# Patient Record
Sex: MALE | Race: ASIAN | URBAN - METROPOLITAN AREA
[De-identification: names, ages, dates, MRNs, and addresses within clinical notes are randomized per-mention and may not be internally consistent; named-entity substitution may affect disease eponyms.]

---

## 2018-02-15 ENCOUNTER — OFFICE VISIT (OUTPATIENT)
Dept: ORTHOPEDICS | Facility: CLINIC | Age: 28
End: 2018-02-15
Payer: COMMERCIAL

## 2018-02-15 ENCOUNTER — RADIANT APPOINTMENT (OUTPATIENT)
Dept: GENERAL RADIOLOGY | Facility: CLINIC | Age: 28
End: 2018-02-15
Attending: PEDIATRICS
Payer: COMMERCIAL

## 2018-02-15 VITALS
BODY MASS INDEX: 31.57 KG/M2 | SYSTOLIC BLOOD PRESSURE: 118 MMHG | HEIGHT: 63 IN | WEIGHT: 178.2 LBS | DIASTOLIC BLOOD PRESSURE: 62 MMHG

## 2018-02-15 DIAGNOSIS — S68.119A TRAUMATIC AMPUTATION OF FINGERTIP, INITIAL ENCOUNTER: ICD-10-CM

## 2018-02-15 DIAGNOSIS — S62.635A CLOSED DISPLACED FRACTURE OF DISTAL PHALANX OF LEFT RING FINGER, INITIAL ENCOUNTER: ICD-10-CM

## 2018-02-15 DIAGNOSIS — S62.635A CLOSED DISPLACED FRACTURE OF DISTAL PHALANX OF LEFT RING FINGER, INITIAL ENCOUNTER: Primary | ICD-10-CM

## 2018-02-15 PROCEDURE — 73140 X-RAY EXAM OF FINGER(S): CPT | Mod: LT

## 2018-02-15 PROCEDURE — 99204 OFFICE O/P NEW MOD 45 MIN: CPT | Performed by: PEDIATRICS

## 2018-02-15 RX ORDER — HYDROCODONE BITARTRATE AND ACETAMINOPHEN 5; 325 MG/1; MG/1
1 TABLET ORAL EVERY 6 HOURS PRN
COMMUNITY
End: 2018-03-21

## 2018-02-15 NOTE — MR AVS SNAPSHOT
After Visit Summary   2/15/2018    Jean Pierre Lipscomb    MRN: [id number]           Patient Information     Date Of Birth          1990        Visit Information        Provider Department      2/15/2018 11:20 AM Mirta Torres MD [facility] [place]        Today's Diagnoses     Closed displaced fracture of distal phalanx of left ring finger, initial encounter    -  1    Traumatic amputation of fingertip, initial encounter          Care Instructions    Plan:  - Today's Plan of Care:  Referral to an Orthopedic Surgeon  Re-dress and splint finger  Letter for work/restrictions    Follow Up: with ortho hand surgery              Follow-ups after your visit        Additional Services     ORTHO  REFERRAL       [facility] is referring you to the Orthopedic  Services at [facility].       The  Representative will assist you in the coordination of your Orthopedic and Musculoskeletal Care as prescribed by your physician.    The  Representative will call you within 1 business day to help schedule your appointment, or you may contact the  Representative at:    All areas ~ [phone]     Type of Referral : Surgical / Specialist -- hand surgery      Timeframe requested: 1 - 2 days    Coverage of these services is subject to the terms and limitations of your health insurance plan.  Please call member services at your health plan with any benefit or coverage questions.      If X-rays, CT or MRI's have been performed, please contact the facility where they were done to arrange for , prior to your scheduled appointment.  Please bring this referral request to your appointment and present it to your specialist.                  Who to contact     If you have questions or need follow up information about today's clinic visit or your schedule please contact [facility] [place] directly at  "[phone].  Normal or non-critical lab and imaging results will be communicated to you by MyChart, letter or phone within 4 business days after the clinic has received the results. If you do not hear from us within 7 days, please contact the clinic through [employer] or phone. If you have a critical or abnormal lab result, we will notify you by phone as soon as possible.  Submit refill requests through [employer] or call your pharmacy and they will forward the refill request to us. Please allow 3 business days for your refill to be completed.          Additional Information About Your Visit        [employer] Information     [employer] lets you send messages to your doctor, view your test results, renew your prescriptions, schedule appointments and more. To sign up, go to [email or web address] . Click on \"Log in\" on the left side of the screen, which will take you to the Welcome page. Then click on \"Sign up Now\" on the right side of the page.     You will be asked to enter the access code listed below, as well as some personal information. Please follow the directions to create your username and password.     Your access code is: [email or web address]  Expires: 2018 12:25 PM     Your access code will  in 90 days. If you need help or a new code, please call your [place] clinic or [phone].        Care EveryWhere ID     This is your Care EveryWhere ID. This could be used by other organizations to access your [place] medical records  [id number]        Your Vitals Were     Height BMI (Body Mass Index)                5' 3\" (1.6 m) 31.57 kg/m2           Blood Pressure from Last 3 Encounters:   02/15/18 118/62    Weight from Last 3 Encounters:   02/15/18 178 lb 3.2 oz (80.8 kg)              We Performed the Following     ORTHO  REFERRAL        Primary Care Provider Fax #    Physician No Ref-Primary [phone]       No address on file        Equal Access to Services     MAIKEL ALEJANDRO AH: Taylor fermin " Terri, wakrishangigi gregorygabe, jaimie kashannan farris, crow williamson carlosfarida guzmanrubén laLudygamal stacy. So [facility] [phone].    ATENCIÓN: Si trinity davis, tiene a gardner disposición servicios gratuitos de asistencia lingüística. Zenia al [phone].    We comply with applicable federal civil rights laws and [place] laws. We do not discriminate on the basis of race, color, national origin, age, disability, sex, sexual orientation, or gender identity.            Thank you!     Thank you for choosing [facility]  for your care. Our goal is always to provide you with excellent care. Hearing back from our patients is one way we can continue to improve our services. Please take a few minutes to complete the written survey that you may receive in the mail after your visit with us. Thank you!             Your Updated Medication List - Protect others around you: Learn how to safely use, store and throw away your medicines at www.disposemymeds.org.          This list is accurate as of 2/15/18 12:25 PM.  Always use your most recent med list.                   Brand Name Dispense Instructions for use Diagnosis    CEPHALEXIN PO      Take 500 mg by mouth 3 times daily        HYDROcodone-acetaminophen 5-325 MG per tablet    NORCO     Take 1 tablet by mouth every 6 hours as needed for moderate to severe pain

## 2018-02-15 NOTE — PROGRESS NOTES
"Sports Medicine Clinic Visit    PCP: No Ref-Primary, Physician    Jean Pierre Ruel is a 27 year old male who is seen  as an ER referral presenting with left ring finger pain and fracture follow-up    Injury: Patient reports an injury ~ 5 days ago.  His right ring finger was shut in a door.  He went to the ED where he was diagnosed with a fracture and had a repair done.    Location of Pain: right right finger, distal  Duration of Pain: 2/11/18  Rating of Pain at worst: 8/10  Rating of Pain Currently: 8/10  Symptoms are better with: splint  Symptoms are worse with: motion, touch  Additional Features:   Positive: swelling, bruising, paresthesias and numbness   Negative: popping, grinding, catching, locking, instability, weakness and pain in other joints  Other evaluation and/or treatments so far consists of: splint  Prior History of related problems: none    Social History: metal fabrication    Review of Systems  Skin: yes bruising, yes swelling  Musculoskeletal: as above  Neurologic: no numbness, paresthesias  Remainder of review of systems is negative including constitutional, CV, pulmonary, GI, except as noted in HPI or medical history.    Patient's current problem list, past medical and surgical history, and family history were reviewed.    There is no problem list on file for this patient.    No past medical history on file.  No past surgical history on file.  No family history on file.      Objective  /62 (BP Location: Left arm, Patient Position: Sitting, Cuff Size: Adult Large)  Ht 5' 3\" (1.6 m)  Wt 178 lb 3.2 oz (80.8 kg)  BMI 31.57 kg/m2    GENERAL APPEARANCE: healthy, alert and no distress   GAIT: NORMAL  SKIN: no suspicious lesions or rashes  HEENT: Sclera clear, anicteric  CV: good peripheral pulses  RESP: Breathing not labored  NEURO: Normal strength and tone, mentation intact and speech normal  PSYCH:  mentation appears normal and affect normal/bright    Bilateral Wrist and Hand exam  Inspection:       " Left ring finger partial amputation with intact sutures bilaterally, some dried blood    Tender:       Distal phalanx left    Non Tender:       Remainder of the Wrist and Hand left    ROM:       Limited motion of left DIP joint 4th finger    Strength:       Limited strength left DIP joint 4th finger    Neurovascular:       2+ radial pulses bilaterally with brisk capillary refill and      normal sensation to light touch in the radial, median and ulnar nerve distributions      Radiology  I visualized and reviewed these images with the patient  3 XR views of left ring finger reviewed: displaced distal phalanx fracture with evidence of soft tissue injury as well  - will follow official read      I ordered, visualized and reviewed these images with the patient  2 XR views of left ring reviewed: mildly displaced distal phalanx fracture  - will follow official read    Assessment:  1. Closed displaced fracture of distal phalanx of left ring finger, initial encounter    2. Traumatic amputation of fingertip, initial encounter      Given displaced distal phalanx fracture and partial amputation, I recommend hand surgery referral.  Continue antibiotic course, discussed concerning signs and symptoms for infection.  Continue wound dressing and splinting.    Plan:  - Today's Plan of Care:  Referral to an Orthopedic Surgeon  Re-dress and splint finger  Letter for work/restrictions    Follow Up: with ortho hand surgery    Concerning signs and symptoms were reviewed.  The patient expressed understanding of this management plan and all questions were answered at this time.    Mirta Torres MD [facility]  Primary Care Sports Medicine  [facility]

## 2018-02-15 NOTE — LETTER
February 15, 2018      Jean Pierre Lipscomb  [address]  [place] [address]        To Whom It May Concern:    Jean Pierre Lipscomb was seen in our clinic today for a left hand/finger injury. He may return to work with the following restrictions: no use of left hand until surgical consult. He is to follow-up with hand surgery for additional evaluation and further restrictions.      Sincerely,          Mirta Torres MD

## 2018-02-15 NOTE — LETTER
"    2/15/2018         RE: Jean Pierre Lipscomb  [address]  [place] [address]        Dear Colleague,    Thank you for referring your patient, Jean Pierre Lipscomb, to the [facility] [place]. Please see a copy of my visit note below.    Sports Medicine Clinic Visit    PCP: No Ref-Primary, Physician    Jean Pierre Lipscomb is a 27 year old male who is seen  as an ER referral presenting with left ring finger pain and fracture follow-up    Injury: Patient reports an injury ~ 5 days ago.  His right ring finger was shut in a door.  He went to the ED where he was diagnosed with a fracture and had a repair done.    Location of Pain: right right finger, distal  Duration of Pain: 2/11/18  Rating of Pain at worst: 8/10  Rating of Pain Currently: 8/10  Symptoms are better with: splint  Symptoms are worse with: motion, touch  Additional Features:   Positive: swelling, bruising, paresthesias and numbness   Negative: popping, grinding, catching, locking, instability, weakness and pain in other joints  Other evaluation and/or treatments so far consists of: splint  Prior History of related problems: none    Social History: metal fabrication    Review of Systems  Skin: yes bruising, yes swelling  Musculoskeletal: as above  Neurologic: no numbness, paresthesias  Remainder of review of systems is negative including constitutional, CV, pulmonary, GI, except as noted in HPI or medical history.    Patient's current problem list, past medical and surgical history, and family history were reviewed.    There is no problem list on file for this patient.    No past medical history on file.  No past surgical history on file.  No family history on file.      Objective  /62 (BP Location: Left arm, Patient Position: Sitting, Cuff Size: Adult Large)  Ht 5' 3\" (1.6 m)  Wt 178 lb 3.2 oz (80.8 kg)  BMI 31.57 kg/m2    GENERAL APPEARANCE: healthy, alert and no distress   GAIT: NORMAL  SKIN: no suspicious lesions or rashes  HEENT: " Sclera clear, anicteric  CV: good peripheral pulses  RESP: Breathing not labored  NEURO: Normal strength and tone, mentation intact and speech normal  PSYCH:  mentation appears normal and affect normal/bright    Bilateral Wrist and Hand exam  Inspection:       Left ring finger partial amputation with intact sutures bilaterally, some dried blood    Tender:       Distal phalanx left    Non Tender:       Remainder of the Wrist and Hand left    ROM:       Limited motion of left DIP joint 4th finger    Strength:       Limited strength left DIP joint 4th finger    Neurovascular:       2+ radial pulses bilaterally with brisk capillary refill and      normal sensation to light touch in the radial, median and ulnar nerve distributions      Radiology  I visualized and reviewed these images with the patient  3 XR views of left ring finger reviewed: displaced distal phalanx fracture with evidence of soft tissue injury as well  - will follow official read      I ordered, visualized and reviewed these images with the patient  2 XR views of left ring reviewed: mildly displaced distal phalanx fracture  - will follow official read    Assessment:  1. Closed displaced fracture of distal phalanx of left ring finger, initial encounter    2. Traumatic amputation of fingertip, initial encounter      Given displaced distal phalanx fracture and partial amputation, I recommend hand surgery referral.  Continue antibiotic course, discussed concerning signs and symptoms for infection.  Continue wound dressing and splinting.    Plan:  - Today's Plan of Care:  Referral to an Orthopedic Surgeon  Re-dress and splint finger  Letter for work/restrictions    Follow Up: with ortho hand surgery    Concerning signs and symptoms were reviewed.  The patient expressed understanding of this management plan and all questions were answered at this time.    Mirta Torres MD CAQ  Primary Care Sports Medicine  [facility]    Again, thank  you for allowing me to participate in the care of your patient.        Sincerely,        Mirta Torres MD

## 2018-02-15 NOTE — PATIENT INSTRUCTIONS
Plan:  - Today's Plan of Care:  Referral to an Orthopedic Surgeon  Re-dress and splint finger  Letter for work/restrictions    Follow Up: with ortho hand surgery

## 2018-02-19 ENCOUNTER — OFFICE VISIT (OUTPATIENT)
Dept: ORTHOPEDICS | Facility: CLINIC | Age: 28
End: 2018-02-19
Payer: COMMERCIAL

## 2018-02-19 ENCOUNTER — TELEPHONE (OUTPATIENT)
Dept: ORTHOPEDICS | Facility: CLINIC | Age: 28
End: 2018-02-19

## 2018-02-19 VITALS — WEIGHT: 178 LBS | HEIGHT: 63 IN | BODY MASS INDEX: 31.54 KG/M2 | RESPIRATION RATE: 16 BRPM

## 2018-02-19 DIAGNOSIS — S62.639B OPEN FRACTURE OF TUFT OF DISTAL PHALANX OF FINGER: Primary | ICD-10-CM

## 2018-02-19 PROCEDURE — 99203 OFFICE O/P NEW LOW 30 MIN: CPT | Performed by: ORTHOPAEDIC SURGERY

## 2018-02-19 ASSESSMENT — PAIN SCALES - GENERAL: PAINLEVEL: MODERATE PAIN (4)

## 2018-02-19 NOTE — PROGRESS NOTES
"Chief Complaint:   Chief Complaint   Patient presents with     Trauma     Closed displaced fracture of distal phalanx of left ring finger. He was seen at [facility] at [place] DOI: 2/11/2018 jammed the finger in the bathroom door. Finger burning, throbbing pain,numbness, tingling,swelling and bruisin.  CEPHALEXIN -three times daily for 7 days and HYDROcodone as needed       Jean Pierre Lipscomb is seen today in the [facility] for evaluation of left ring finger injury at the request of Dr. Mirta Torres.      HPI: Jean Pierre Lipscomb is a 27 year old male, right-hand dominant, who presents for evaluation and management of a left ring finger injury. He injured his hand on 2/11/2018. He was leaning on the wall with his finger between the hinges of the bathroom door when the door opened, crushing the finger.    Patient had immediate bleeding, and swelling. After washing off his finger, he noticed his fingertip was \"hanging\". He was seen at [facility] in [place] and was cleaned and sutured. He was also given hydrocodone and antibiotics. It has been 8 days since the initial injury. Today he has moderate pain, rated a 4/10. Pain is located over the distal left ring finger. He has not been taking the pain medication, but takes the antibiotics. Symptoms include burning and throbbing pain, numbness and tingling, and swelling. Bruising diffusely over the finger. Patient notes he is able to feel sensation over the fingertips, however diminished. He denies any other injuries to his upper extremity.     Symptoms: moderate pain, +swelling, + bruising, +stiffness  Location: distal phalanx of ring finger.  Pain severity: 4/10  Pain quality: aching, sharp  Frequency of symptoms: frequently.  Aggravating factors: with any range of motion or palpation .  Relieving factors: at rest, with pain medication.        Previous treatment: antibiotics, hydrocodone    Past medical history:  has no past medical history on file. " "    Past surgical history:  has no past surgical history on file.     Medications:    Current Outpatient Prescriptions   Medication Sig Dispense Refill     CEPHALEXIN PO Take 500 mg by mouth 3 times daily       HYDROcodone-acetaminophen (NORCO) 5-325 MG per tablet Take 1 tablet by mouth every 6 hours as needed for moderate to severe pain          Allergies:   No Known Allergies     Family History: family history is not on file.     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 He has never used smokeless tobacco.    Review of Systems:  ROS: 10 point ROS neg other than the symptoms noted above in the HPI and past medical history.    Physical Exam  GENERAL APPEARANCE: healthy, alert, no distress.   SKIN: no suspicious lesions or rashes  NEURO: Normal strength and tone, mentation intact and speech normal  PSYCH:  mentation appears normal and affect normal. Not anxious.  RESPIRATORY: No increased work of breathing.    Resp 16  Ht 1.6 m (5' 3\")  Wt 80.7 kg (178 lb)  BMI 31.53 kg/m2     LEFT HAND EXAM:    The splint was removed.  There are sutures across circumferential laceration dorsum of the distal ring finger extending radially and ulnarly, 3 sutures either side of nail plate.  There is moderate swelling in the distal ring finger.  There is moderate tenderness in the distal ring finger.  There is moderate ecchymosis.  There is no erythema of the surrounding skin.  There is no maceration of the skin.  There is no deformity in the area.  Range of motion: stiff, and (any)movements are painful.  Decreased sensation to tip of finger tip.  Brisk capillary refill to all fingers.   Palpable radial pulse, 2+  Intact epl fpl fdp fds edc wrist flexion/extenion biceps triceps deltoid.    X-rays:  2 views left ring finger from 2/15/2018 were reviewed personally in clinic today. On my review of the Xrays, minimally displaced tuft fracture distal phalanx.    3 views left ring finger from [facility] on 2/11/2018 were reviewed in clinic today. On my review, there is a mildly displaced fracture of the distal phalanx tuft.    Impression:  27 year old male with left ring finger injury, minimally displaced open left ring finger tuft fracture.    Plan:  * reviewed xrays, minimally displaced. Should heal with time, likely 3 months.  * at risk for infection given open injury, finish off course of oral antibiotics from the emergency room.  * Immobilization: finger splint. When at rest, can take splint off for gentle range of motion of the finger.  * Continue to elevate the finger to reduce swelling  * Take antibiotics as prescribed until completed.  * over the counter pain control, acetaminophen.  * Workability update: no return to work at this time. Re-assess in 2 weeks.  * suture removal. Hot soapy soaks in a week.  * Return to clinic in 2 weeks for clinical follow up. No xrays.    The information in this document, created by a scribe for me, accurately reflects the services I personally performed and the decisions made by me. I have reviewed and approved this document for accuracy.      Jason Lovelace M.D., M.S.  Dept. of Orthopaedic Surgery  [facility]

## 2018-02-19 NOTE — PATIENT INSTRUCTIONS
Please remember to call and schedule a follow up appointment if one was recommended at your earliest convenience.  Orthopedics CLINIC HOURS TELEPHONE NUMBER   Dr. Albania Fox  Certified Medical Assistant   Monday & Wednesday   8am - 5pm  Thursday 1pm - 5pm  Friday 8am -11:30am Specialty schedulers:   [phone] to make an appointment with any Specialty Provider.   Main Clinic:   [phone] to make an appointment with your primary provider   Urgent Care locations:    [facility] Monday-Friday Closed  Saturday-Sunday 9am-5pm      Monday-Friday 12pm - 8pm  Saturday-Sunday 9am-5pm [phone]     If SURGERY has been recommended, please call our Specialty Schedulers at [phone] to schedule your procedure.    If you need a medication refill, please contact your pharmacy. Please allow 3 business days for your refill to be completed.    If an MRI or CT scan has been recommended, please call [facility] Schedulers at [phone] to schedule your appointment.  Use [employer] (secure e-mail communication and access to your chart) to send a message or to make an appointment. Please ask how you can sign up for [employer].  Your care team's suggested websites for health information:   [email or web address] : Up to date and easily searchable information on multiple topics.   [email or web address] : [place] dept of heat, public health issues in [place], N1N1

## 2018-02-19 NOTE — LETTER
[facility] [place]  [address]  [place] [id number] [phone]      WORKABILITY    [facility], Dr. Jason Lovelace M.D., LILI Ledezma, Abelardo Carlson        2/19/2018      RE: Jean Pierre Lipscomb    [address]  [place] [address]        To whom it may concern:     Jean Pierre Lipscomb is under my professional care for left ring finger, distal phalanx open fracture.     Date of injury: 2/11/18.     Mr. Lipscomb should not return to work at this time unless there are strict right handed only duties. No use of left hand.  DURATION OF LIMITATIONS: reassess in 2 weeks      Next appointment: 2 weeks        Gatito Campbell PA-C, CAQ (Ortho)  Supervising Physician: Jason Lovelace M.D., M.S.  Dept. of Orthopaedic Surgery  [facility]

## 2018-02-19 NOTE — MR AVS SNAPSHOT
After Visit Summary   2/19/2018    Jean Pierre Lipscomb    MRN: [id number]           Patient Information     Date Of Birth          1990        Visit Information        Provider Department      2/19/2018 9:15 AM Jason Lovelace MD [facility]        Today's Diagnoses     Open fracture of tuft of distal phalanx of finger    -  1      Care Instructions    Please remember to call and schedule a follow up appointment if one was recommended at your earliest convenience.  Orthopedics CLINIC HOURS TELEPHONE NUMBER   Dr. Albania Fox  Certified Medical Assistant   Monday & Wednesday   8am - 5pm  Thursday 1pm - 5pm  Friday 8am -11:30am Specialty schedulers:   [phone] to make an appointment with any Specialty Provider.   Main Clinic:   [phone] to make an appointment with your primary provider   Urgent Care locations:    [facility] Monday-Friday Closed  Saturday-Sunday 9am-5pm      Monday-Friday 12pm - 8pm  Saturday-Sunday 9am-5pm [phone]     If SURGERY has been recommended, please call our Specialty Schedulers at [phone] to schedule your procedure.    If you need a medication refill, please contact your pharmacy. Please allow 3 business days for your refill to be completed.    If an MRI or CT scan has been recommended, please call [facility] Schedulers at [phone] to schedule your appointment.  Use [employer] (secure e-mail communication and access to your chart) to send a message or to make an appointment. Please ask how you can sign up for [employer].  Your care team's suggested websites for health information:   [email or web address] : Up to date and easily searchable information on multiple topics.   [email or web address] : [place] dept of heat, public health issues in [place], N1N1              Follow-ups after your visit        Follow-up notes from your care team     Return in about 2 weeks (around  "3/5/2018) for clinical recheck.      Your next 10 appointments already scheduled     Mar 05, 2018  9:15 AM CST   Return Visit with Jason Lovelace MD   [facility] ([facility])    [address]  [place] [id number] [phone]              Who to contact     If you have questions or need follow up information about today's clinic visit or your schedule please contact [facility] directly at [phone].  Normal or non-critical lab and imaging results will be communicated to you by Synkerhart, letter or phone within 4 business days after the clinic has received the results. If you do not hear from us within 7 days, please contact the clinic through Synkerhart or phone. If you have a critical or abnormal lab result, we will notify you by phone as soon as possible.  Submit refill requests through eMar or call your pharmacy and they will forward the refill request to us. Please allow 3 business days for your refill to be completed.          Additional Information About Your Visit        MyChart Information     eMar lets you send messages to your doctor, view your test results, renew your prescriptions, schedule appointments and more. To sign up, go to [email or web address] . Click on \"Log in\" on the left side of the screen, which will take you to the Welcome page. Then click on \"Sign up Now\" on the right side of the page.     You will be asked to enter the access code listed below, as well as some personal information. Please follow the directions to create your username and password.     Your access code is: [email or web address]  Expires: 2018 12:25 PM     Your access code will  in 90 days. If you need help or a new code, please call your [place] clinic or [phone].        Care EveryWhere ID     This is your Care EveryWhere ID. This could be used by other organizations to access your [place] medical " "records  [id number]        Your Vitals Were     Respirations Height BMI (Body Mass Index)             16 5' 3\" (1.6 m) 31.53 kg/m2          Blood Pressure from Last 3 Encounters:   02/15/18 118/62    Weight from Last 3 Encounters:   02/19/18 178 lb (80.7 kg)   02/15/18 178 lb 3.2 oz (80.8 kg)              Today, you had the following     No orders found for display       Primary Care Provider Fax #    Physician No Ref-Primary [phone]       No address on file        Equal Access to Services     [facility]: Hadii aad von hadasho Soomaali, waaxda luqadaha, qaybta kaalmada adefaridayagigi, crow chapin . So [facility] [phone].    ATENCIÓN: Si habla español, tiene a gardner disposición servicios gratuitos de asistencia lingüística. [facility] [phone].    We comply with applicable federal civil rights laws and [place] laws. We do not discriminate on the basis of race, color, national origin, age, disability, sex, sexual orientation, or gender identity.            Thank you!     Thank you for choosing [facility]  for your care. Our goal is always to provide you with excellent care. Hearing back from our patients is one way we can continue to improve our services. Please take a few minutes to complete the written survey that you may receive in the mail after your visit with us. Thank you!             Your Updated Medication List - Protect others around you: Learn how to safely use, store and throw away your medicines at www.disposemymeds.org.          This list is accurate as of 2/19/18  3:34 PM.  Always use your most recent med list.                   Brand Name Dispense Instructions for use Diagnosis    CEPHALEXIN PO      Take 500 mg by mouth 3 times daily        HYDROcodone-acetaminophen 5-325 MG per tablet    NORCO     Take 1 tablet by mouth every 6 hours as needed for moderate to severe pain          "

## 2018-02-19 NOTE — LETTER
"    2/19/2018         RE: Jean Pierre Lipscomb  [address]  [place] [address]        Dear Colleague,    Thank you for referring your patient, Jean Pierre Lipscomb, to the [facility]. Please see a copy of my visit note below.    Chief Complaint:   Chief Complaint   Patient presents with     Trauma     Closed displaced fracture of distal phalanx of left ring finger. He was seen at [facility] at [place] DOI: 2/11/2018 jammed the finger in the bathroom door. Finger burning, throbbing pain,numbness, tingling,swelling and bruisin.  CEPHALEXIN -three times daily for 7 days and HYDROcodone as needed       Jean Pierre Lipscomb is seen today in the [facility] for evaluation of left ring finger injury at the request of Dr. Mirta Torres.      HPI: Jean Pierre Lipscomb is a 27 year old male, right-hand dominant, who presents for evaluation and management of a left ring finger injury. He injured his hand on 2/11/2018. He was leaning on the wall with his finger between the hinges of the bathroom door when the door opened, crushing the finger.    Patient had immediate bleeding, and swelling. After washing off his finger, he noticed his fingertip was \"hanging\". He was seen at [facility] in [place] and was cleaned and sutured. He was also given hydrocodone and antibiotics. It has been 8 days since the initial injury. Today he has moderate pain, rated a 4/10. Pain is located over the distal left ring finger. He has not been taking the pain medication, but takes the antibiotics. Symptoms include burning and throbbing pain, numbness and tingling, and swelling. Bruising diffusely over the finger. Patient notes he is able to feel sensation over the fingertips, however diminished. He denies any other injuries to his upper extremity.     Symptoms: moderate pain, +swelling, + bruising, +stiffness  Location: distal phalanx of ring finger.  Pain severity: 4/10  Pain quality: aching, " "sharp  Frequency of symptoms: frequently.  Aggravating factors: with any range of motion or palpation .  Relieving factors: at rest, with pain medication.        Previous treatment: antibiotics, hydrocodone    Past medical history:  has no past medical history on file.     Past surgical history:  has no past surgical history on file.     Medications:    Current Outpatient Prescriptions   Medication Sig Dispense Refill     CEPHALEXIN PO Take 500 mg by mouth 3 times daily       HYDROcodone-acetaminophen (NORCO) 5-325 MG per tablet Take 1 tablet by mouth every 6 hours as needed for moderate to severe pain          Allergies:   No Known Allergies     Family History: family history is not on file.     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 He has never used smokeless tobacco.    Review of Systems:  ROS: 10 point ROS neg other than the symptoms noted above in the HPI and past medical history.    Physical Exam  GENERAL APPEARANCE: healthy, alert, no distress.   SKIN: no suspicious lesions or rashes  NEURO: Normal strength and tone, mentation intact and speech normal  PSYCH:  mentation appears normal and affect normal. Not anxious.  RESPIRATORY: No increased work of breathing.    Resp 16  Ht 1.6 m (5' 3\")  Wt 80.7 kg (178 lb)  BMI 31.53 kg/m2     LEFT HAND EXAM:    The splint was removed.  There are sutures across circumferential laceration dorsum of the distal ring finger extending radially and ulnarly, 3 sutures either side of nail plate.  There is moderate swelling in the distal ring finger.  There is moderate tenderness in the distal ring finger.  There is moderate ecchymosis.  There is no erythema of the surrounding skin.  There is no maceration of the skin.  There is no deformity in the area.  Range of motion: stiff, and (any)movements are painful.  Decreased sensation to tip of finger tip.  Brisk capillary refill to all fingers.   Palpable radial pulse, 2+  Intact epl fpl " fdp fds edc wrist flexion/extenion biceps triceps deltoid.    X-rays:  2 views left ring finger from 2/15/2018 were reviewed personally in clinic today. On my review of the Xrays, minimally displaced tuft fracture distal phalanx.    3 views left ring finger from [facility] on 2/11/2018 were reviewed in clinic today. On my review, there is a mildly displaced fracture of the distal phalanx tuft.    Impression:  27 year old male with left ring finger injury, minimally displaced open left ring finger tuft fracture.    Plan:  * reviewed xrays, minimally displaced. Should heal with time, likely 3 months.  * at risk for infection given open injury, finish off course of oral antibiotics from the emergency room.  * Immobilization: finger splint. When at rest, can take splint off for gentle range of motion of the finger.  * Continue to elevate the finger to reduce swelling  * Take antibiotics as prescribed until completed.  * over the counter pain control, acetaminophen.  * Workability update: no return to work at this time. Re-assess in 2 weeks.  * suture removal. Hot soapy soaks in a week.  * Return to clinic in 2 weeks for clinical follow up. No xrays.    The information in this document, created by a scribe for me, accurately reflects the services I personally performed and the decisions made by me. I have reviewed and approved this document for accuracy.      Jason Lovelace M.D., M.S.  Dept. of Orthopaedic Surgery  [facility]      Again, thank you for allowing me to participate in the care of your patient.        Sincerely,        Jason Lovelace MD

## 2018-02-19 NOTE — NURSING NOTE
"Chief Complaint   Patient presents with     Trauma     Closed displaced fracture of distal phalanx of left ring finger. He was seen at [facility] at [place] DOI: 2/11/2018 jammed the finger in the bathroom door. Finger burning, throbbing pain,numbness, tingling,swelling and bruisin.  CEPHALEXIN -Antibiotic three times daily for 7 days and HYDROcodone as needed        Initial Resp 16  Ht 1.6 m (5' 3\")  Wt 80.7 kg (178 lb)  BMI 31.53 kg/m2 Estimated body mass index is 31.53 kg/(m^2) as calculated from the following:    Height as of this encounter: 1.6 m (5' 3\").    Weight as of this encounter: 80.7 kg (178 lb).  Medication Reconciliation: complete   Sariah Wagner MA      "

## 2018-02-21 ENCOUNTER — TELEPHONE (OUTPATIENT)
Dept: ORTHOPEDICS | Facility: CLINIC | Age: 28
End: 2018-02-21

## 2018-02-21 NOTE — TELEPHONE ENCOUNTER
Reason for Call:  Other call back    Detailed comments: Patient called about the forums he dropped off today wondering when they will be completed. Could you please call him back?    Phone Number Patient can be reached at: Cell number on file:    Telephone Information:   Mobile [phone]       Best Time: any    Can we leave a detailed message on this number? YES    Call taken on 2/21/2018 at 2:25 PM by Trudy Song

## 2018-02-21 NOTE — TELEPHONE ENCOUNTER
I spoke to Huvsi and had the forms faxed to me at [facility]. I will complete them and get them faxed over to [phone], which is the number that he gave me. He appreciated the call.    Gatito Campbell PA-C, CASARAH (Ortho)  Supervising Physician: Jason Lovelace M.D., M.S.  Dept. of Orthopaedic Surgery  [facility]

## 2018-02-21 NOTE — TELEPHONE ENCOUNTER
I spoke to Jean Pierre and he will drop off STD forms at [facility] this morning. Once completed I will call him so he can pick them up.    Gatito Campbell PA-C, KAYCEE (Ortho)  Supervising Physician: Jason Lovelace M.D., M.S.  Dept. of Orthopaedic Surgery  [facility]

## 2018-03-05 ENCOUNTER — OFFICE VISIT (OUTPATIENT)
Dept: ORTHOPEDICS | Facility: CLINIC | Age: 28
End: 2018-03-05
Payer: COMMERCIAL

## 2018-03-05 VITALS — BODY MASS INDEX: 32.76 KG/M2 | HEIGHT: 62 IN | RESPIRATION RATE: 16 BRPM | WEIGHT: 178 LBS

## 2018-03-05 DIAGNOSIS — S62.639B OPEN FRACTURE OF TUFT OF DISTAL PHALANX OF FINGER: Primary | ICD-10-CM

## 2018-03-05 PROCEDURE — 99213 OFFICE O/P EST LOW 20 MIN: CPT | Performed by: ORTHOPAEDIC SURGERY

## 2018-03-05 ASSESSMENT — PAIN SCALES - GENERAL: PAINLEVEL: SEVERE PAIN (6)

## 2018-03-05 NOTE — LETTER
"    3/5/2018         RE: Jean Pierre Lipscomb  [address]  [place] [address]        Dear Colleague,    Thank you for referring your patient, Jean Pierre Lipscomb, to the [facility]. Please see a copy of my visit note below.    Chief Complaint:   Chief Complaint   Patient presents with     RECHECK     Left ring finger distal phalanx fracture. DOI: 2/11/18. 3 weeks out from injury. Doing well. Incision healing nicely. Still very sensitive under nail and tip of finger.           HPI: Jean Pierre Lipscomb is a 27 year old male, right-hand dominant, who presents for followup evaluation and management of a left ring finger injury. He injured his hand on 2/11/2018. He was leaning on the wall with his finger between the hinges of the bathroom door when the door opened, crushing the finger. Patient had immediate bleeding, and swelling. After washing off his finger, he noticed his fingertip was \"hanging\". He was seen at [facility] in [place] and was cleaned and sutured. He was also given hydrocodone and antibiotics. It has been 3 weeks since the injury. He returns today doing well. Today he has continued pain, 6/10. Still very sensitive under the nail and at the tip of the finger. Finger appears to be healing well. After washing off the dead skin and dried blood, he noticed there was one more stitch in the finger. He took the stitch out himself.      Symptoms: moderate pain, +swelling, +stiffness, +sensitivity  Location: distal phalanx of ring finger.  Pain severity: 6/10  Pain quality: aching, sharp  Frequency of symptoms: frequently.  Aggravating factors: with any range of motion or palpation .  Relieving factors: at rest, with pain medication.        Previous treatment: antibiotics, hydrocodone    Past medical history:  has no past medical history on file.     Past surgical history:  has no past surgical history on file.     Medications:    Current Outpatient Prescriptions   Medication Sig " "Dispense Refill     HYDROcodone-acetaminophen (NORCO) 5-325 MG per tablet Take 1 tablet by mouth every 6 hours as needed for moderate to severe pain       CEPHALEXIN PO Take 500 mg by mouth 3 times daily          Allergies:   No Known Allergies     Family History: family history is not on file.     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 He has never used smokeless tobacco.    Review of Systems:     Denies numbness, tingling, parasthesias.   Denies headaches.   Denies fevers, chills, night sweats   Denies chest pain.   Denies shortness of breath.   Denies any skin problems, abrasions, rashes, irritation.      This document serves as a record of the services and decisions personally performed and made by Jason Lovelace MD. It was created on his behalf by Marsha Chauhan, a trained medical scribe. The creation of this document is based the provider's statements to the medical scribe.    Scribe Marsha Chauhan 8:59 AM 3/5/2018       Physical Exam  GENERAL APPEARANCE: healthy, alert, no distress.   SKIN: no suspicious lesions or rashes  NEURO: Normal strength and tone, mentation intact and speech normal  PSYCH:  mentation appears normal and affect normal. Not anxious.  RESPIRATORY: No increased work of breathing.    Resp 16  Ht 1.575 m (5' 2\")  Wt 80.7 kg (178 lb)  BMI 32.56 kg/m2     LEFT HAND EXAM:    The splint was removed.  There is minimal swelling in the distal ring finger.  There is moderate tenderness in the distal ring finger.  There is no ecchymosis.  There is no erythema of the surrounding skin.  There is no maceration of the skin.  There is no deformity in the area.  Range of motion: stiff, and (any)movements are painful.  Decreased sensation to tip of finger tip.  Brisk capillary refill to all fingers.   Palpable radial pulse, 2+    X-rays: no new xrays today.    2 views left ring finger from 3/5/2018 were reviewed personally in clinic today. On my review of the Xrays, " minimally displaced tuft fracture distal phalanx. As compared to x-rays from 2/19/2018.      Impression:  27 year old male with a healing left ring finger injury, minimally displaced open left ring finger tuft fracture.    Plan:  * wound is healing well.   * hypersensitivity is common with this injury. Will work on desensitization in a couple weeks, discussed and demonstrated.  * Immobilization: stack finger splint. When at rest, can take splint off for gentle range of motion of the finger.  * Rubbing on different textures to help with skin desensitization demonstrated today. Start in 2 weeks.   * Explained that nail may fall off.   * Continue to elevate the finger to reduce swelling    * over the counter pain control, acetaminophen.  * Workability update: no return to work at this time. Re-assess in 2 weeks.  * Return to clinic in 2 weeks for clinical follow up. No xrays.    The information in this document, created by a scribe for me, accurately reflects the services I personally performed and the decisions made by me. I have reviewed and approved this document for accuracy.      Jason Lovelace M.D., M.S.  Dept. of Orthopaedic Surgery  [facility]      Again, thank you for allowing me to participate in the care of your patient.        Sincerely,        Jason Lovelace MD

## 2018-03-05 NOTE — LETTER
[facility]  [address]  [place] [id number] [phone]      WORKABILITY    [facility], Dr. Jason Lovelace M.D., LILI Ledezmaine, Abelardo Carlson        3/5/2018      RE: Jean Pierre Lipscomb    [address]  [place] [address]        To whom it may concern:     Jean Pierre Lipscomb is under my professional care for left ring finger open tuft fracture.     Date of injury: 2/11/18.     Mr. Lipscomb should not return to work at this time. We will plan to see him back for follow-up on 3/21/18. So long as healing is consistent with returning to work we will plan for a full return to work on 3/22/18.    Next appointment: 3/21/18        Gatito Campbell PA-C, CAQ (Ortho)  Supervising Physician: Jason Lovelace M.D., M.S.  Dept. of Orthopaedic Surgery  [facility]

## 2018-03-05 NOTE — PROGRESS NOTES
"Chief Complaint:   Chief Complaint   Patient presents with     RECHECK     Left ring finger distal phalanx fracture. DOI: 2/11/18. 3 weeks out from injury. Doing well. Incision healing nicely. Still very sensitive under nail and tip of finger.           HPI: Jean Pierre Lipscmob is a 27 year old male, right-hand dominant, who presents for followup evaluation and management of a left ring finger injury. He injured his hand on 2/11/2018. He was leaning on the wall with his finger between the hinges of the bathroom door when the door opened, crushing the finger. Patient had immediate bleeding, and swelling. After washing off his finger, he noticed his fingertip was \"hanging\". He was seen at [facility] in [place] and was cleaned and sutured. He was also given hydrocodone and antibiotics. It has been 3 weeks since the injury. He returns today doing well. Today he has continued pain, 6/10. Still very sensitive under the nail and at the tip of the finger. Finger appears to be healing well. After washing off the dead skin and dried blood, he noticed there was one more stitch in the finger. He took the stitch out himself.      Symptoms: moderate pain, +swelling, +stiffness, +sensitivity  Location: distal phalanx of ring finger.  Pain severity: 6/10  Pain quality: aching, sharp  Frequency of symptoms: frequently.  Aggravating factors: with any range of motion or palpation .  Relieving factors: at rest, with pain medication.        Previous treatment: antibiotics, hydrocodone    Past medical history:  has no past medical history on file.     Past surgical history:  has no past surgical history on file.     Medications:    Current Outpatient Prescriptions   Medication Sig Dispense Refill     HYDROcodone-acetaminophen (NORCO) 5-325 MG per tablet Take 1 tablet by mouth every 6 hours as needed for moderate to severe pain       CEPHALEXIN PO Take 500 mg by mouth 3 times daily          Allergies:   No Known Allergies     Family " "History: family history is not on file.     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 He has never used smokeless tobacco.    Review of Systems:     Denies numbness, tingling, parasthesias.   Denies headaches.   Denies fevers, chills, night sweats   Denies chest pain.   Denies shortness of breath.   Denies any skin problems, abrasions, rashes, irritation.      This document serves as a record of the services and decisions personally performed and made by Jason Lovelace MD. It was created on his behalf by Marsha Chauhan, a trained medical scribe. The creation of this document is based the provider's statements to the medical scribe.    Scribe Marsha Chauhan 8:59 AM 3/5/2018       Physical Exam  GENERAL APPEARANCE: healthy, alert, no distress.   SKIN: no suspicious lesions or rashes  NEURO: Normal strength and tone, mentation intact and speech normal  PSYCH:  mentation appears normal and affect normal. Not anxious.  RESPIRATORY: No increased work of breathing.    Resp 16  Ht 1.575 m (5' 2\")  Wt 80.7 kg (178 lb)  BMI 32.56 kg/m2     LEFT HAND EXAM:    The splint was removed.  There is minimal swelling in the distal ring finger.  There is moderate tenderness in the distal ring finger.  There is no ecchymosis.  There is no erythema of the surrounding skin.  There is no maceration of the skin.  There is no deformity in the area.  Range of motion: stiff, and (any)movements are painful.  Decreased sensation to tip of finger tip.  Brisk capillary refill to all fingers.   Palpable radial pulse, 2+    X-rays: no new xrays today.    2 views left ring finger from 3/5/2018 were reviewed personally in clinic today. On my review of the Xrays, minimally displaced tuft fracture distal phalanx. As compared to x-rays from 2/19/2018.      Impression:  27 year old male with a healing left ring finger injury, minimally displaced open left ring finger tuft fracture.    Plan:  * wound is healing well.   * " hypersensitivity is common with this injury. Will work on desensitization in a couple weeks, discussed and demonstrated.  * Immobilization: stack finger splint. When at rest, can take splint off for gentle range of motion of the finger.  * Rubbing on different textures to help with skin desensitization demonstrated today. Start in 2 weeks.   * Explained that nail may fall off.   * Continue to elevate the finger to reduce swelling    * over the counter pain control, acetaminophen.  * Workability update: no return to work at this time. Re-assess in 2 weeks.  * Return to clinic in 2 weeks for clinical follow up. No xrays.    The information in this document, created by a scribe for me, accurately reflects the services I personally performed and the decisions made by me. I have reviewed and approved this document for accuracy.      Jason Lovelace M.D., M.S.  Dept. of Orthopaedic Surgery  [facility]

## 2018-03-05 NOTE — MR AVS SNAPSHOT
"              After Visit Summary   3/5/2018    Jean Pierre Lipscomb    MRN: [id number]           Patient Information     Date Of Birth          1990        Visit Information        Provider Department      3/5/2018 9:15 AM Jason Lovelace MD [facility]        Today's Diagnoses     Open fracture of tuft of distal phalanx of finger    -  1       Follow-ups after your visit        Follow-up notes from your care team     Return in about 2 weeks (around 3/19/2018) for clinical recheck.      Your next 10 appointments already scheduled     Mar 21, 2018  8:45 AM CDT   [facility] Jason Lovelace MD   [facility]    [address] [facility] [address] [phone]              Who to contact     If you have questions or need follow up information about today's clinic visit or your schedule please contact [facility] directly at [phone].  Normal or non-critical lab and imaging results will be communicated to you by OG[employer], letter or phone within 4 business days after the clinic has received the results. If you do not hear from us within 7 days, please contact the clinic through Moviestormt or phone. If you have a critical or abnormal lab result, we will notify you by phone as soon as possible.  Submit refill requests through Predily[employer] or call your pharmacy and they will forward the refill request to us. Please allow 3 business days for your refill to be completed.          Additional Information About Your Visit        [employer] Information     Pred[employer]tics lets you send messages to your doctor, view your test results, renew your prescriptions, schedule appointments and more. To sign up, go to [email or web address] . Click on \"Log in\" on the left side of the screen, which will take you to the Welcome page. Then click on \"Sign up Now\" on the right side of the page.     You will be asked to enter the access code " "listed below, as well as some personal information. Please follow the directions to create your username and password.     Your access code is: [email or web address]  Expires: 2018 12:25 PM     Your access code will  in 90 days. If you need help or a new code, please call your [facility] clinic or [phone].        Care EveryWhere ID     This is your Care EveryWhere ID. This could be used by other organizations to access your [facility] medical records  [id number]        Your Vitals Were     Respirations Height BMI (Body Mass Index)             16 5' 2\" (1.575 m) 32.56 kg/m2          Blood Pressure from Last 3 Encounters:   02/15/18 118/62    Weight from Last 3 Encounters:   18 178 lb (80.7 kg)   18 178 lb (80.7 kg)   02/15/18 178 lb 3.2 oz (80.8 kg)              Today, you had the following     No orders found for display       Primary Care Provider Fax #    Physician No Ref-Primary [phone]       No address on file        Equal Access to Services     [facility]: Hadii lisa longoria hadasho Sopura, waaxda luqadaha, qaybta kaalmada aderogelio, crow chapin . So [facility] [phone].    ATENCIÓN: Si habla español, tiene a gardner disposición servicios gratuitos de asistencia lingüística. Zenia al [phone].    We comply with applicable federal civil rights laws and [place] laws. We do not discriminate on the basis of race, color, national origin, age, disability, sex, sexual orientation, or gender identity.            Thank you!     Thank you for choosing [facility]  for your care. Our goal is always to provide you with excellent care. Hearing back from our patients is one way we can continue to improve our services. Please take a few minutes to complete the written survey that you may receive in the mail after your visit with us. Thank you!             Your Updated Medication List - Protect others around you: Learn how to safely use, store and " throw away your medicines at www.disposemymeds.org.          This list is accurate as of 3/5/18  2:50 PM.  Always use your most recent med list.                   Brand Name Dispense Instructions for use Diagnosis    CEPHALEXIN PO      Take 500 mg by mouth 3 times daily        HYDROcodone-acetaminophen 5-325 MG per tablet    NORCO     Take 1 tablet by mouth every 6 hours as needed for moderate to severe pain

## 2018-03-21 ENCOUNTER — OFFICE VISIT (OUTPATIENT)
Dept: ORTHOPEDICS | Facility: CLINIC | Age: 28
End: 2018-03-21
Payer: COMMERCIAL

## 2018-03-21 VITALS
WEIGHT: 182 LBS | HEART RATE: 90 BPM | SYSTOLIC BLOOD PRESSURE: 117 MMHG | HEIGHT: 62 IN | BODY MASS INDEX: 33.49 KG/M2 | DIASTOLIC BLOOD PRESSURE: 82 MMHG

## 2018-03-21 DIAGNOSIS — S62.639B OPEN FRACTURE OF TUFT OF DISTAL PHALANX OF FINGER: Primary | ICD-10-CM

## 2018-03-21 PROCEDURE — 99213 OFFICE O/P EST LOW 20 MIN: CPT | Performed by: ORTHOPAEDIC SURGERY

## 2018-03-21 ASSESSMENT — PAIN SCALES - GENERAL: PAINLEVEL: MILD PAIN (3)

## 2018-03-21 NOTE — MR AVS SNAPSHOT
"              After Visit Summary   3/21/2018    Jean Pierre Lipscomb    MRN: [id number]           Patient Information     Date Of Birth          1990        Visit Information        Provider Department      3/21/2018 8:45 AM Jason Lovelace MD [facility]        Today's Diagnoses     Open fracture of tuft of distal phalanx of finger    -  1       Follow-ups after your visit        Follow-up notes from your care team     Return if symptoms worsen or fail to improve.      Who to contact     If you have questions or need follow up information about today's clinic visit or your schedule please contact [facility] directly at [phone].  Normal or non-critical lab and imaging results will be communicated to you by MyChart, letter or phone within 4 business days after the clinic has received the results. If you do not hear from us within 7 days, please contact the clinic through MyChart or phone. If you have a critical or abnormal lab result, we will notify you by phone as soon as possible.  Submit refill requests through [employer] or call your pharmacy and they will forward the refill request to us. Please allow 3 business days for your refill to be completed.          Additional Information About Your Visit        MyChart Information     [employer] lets you send messages to your doctor, view your test results, renew your prescriptions, schedule appointments and more. To sign up, go to [email or web address] . Click on \"Log in\" on the left side of the screen, which will take you to the Welcome page. Then click on \"Sign up Now\" on the right side of the page.     You will be asked to enter the access code listed below, as well as some personal information. Please follow the directions to create your username and password.     Your access code is: [email or web address]  Expires: 2018  1:25 PM     Your access code will  in 90 days. If you need help or a new code, please call your [facility] or " "[phone].        Care EveryWhere ID     This is your Care EveryWhere ID. This could be used by other organizations to access your [facility] medical records  [id number]        Your Vitals Were     Pulse Height BMI (Body Mass Index)             90 5' 2\" (1.575 m) 33.29 kg/m2          Blood Pressure from Last 3 Encounters:   03/21/18 117/82   02/15/18 118/62    Weight from Last 3 Encounters:   03/21/18 182 lb (82.6 kg)   03/05/18 178 lb (80.7 kg)   02/19/18 178 lb (80.7 kg)              Today, you had the following     No orders found for display       Primary Care Provider Fax #    Physician No Ref-Primary [phone]       No address on file        Equal Access to Services     MAIKEL ALEJANDRO : Taylor Murray, waюлия luqadaha, qaybta kaalmada adefaridayagigi, crow chapin . So [facility] [phone].    ATENCIÓN: Si habla español, tiene a gardner disposición servicios gratuitos de asistencia lingüística. Llame al [phone].    We comply with applicable federal civil rights laws and [place] laws. We do not discriminate on the basis of race, color, national origin, age, disability, sex, sexual orientation, or gender identity.            Thank you!     Thank you for choosing [facility]  for your care. Our goal is always to provide you with excellent care. Hearing back from our patients is one way we can continue to improve our services. Please take a few minutes to complete the written survey that you may receive in the mail after your visit with us. Thank you!             Your Updated Medication List - Protect others around you: Learn how to safely use, store and throw away your medicines at www.disposemymeds.org.      Notice  As of 3/21/2018 11:39 AM    You have not been prescribed any medications.      "

## 2018-03-21 NOTE — PROGRESS NOTES
"Chief Complaint:   Chief Complaint   Patient presents with     RECHECK     Left ring finger fx. DOI 2/11/18, 5 week s/p. Patient states his finger is doing pretty good. He only has pain under the nail or where the stiches were, especially if he bumps it hard. Denies any N/T.        HPI: Jean Pierre Lipscomb is a 27 year old male, right-hand dominant, who presents for followup evaluation and management of a left ring finger injury. He injured his hand on 2/11/2018. He was leaning on the wall with his finger between the hinges of the bathroom door when the door opened, crushing the finger. Patient had immediate bleeding, and swelling. After washing off his finger, he noticed his fingertip was \"hanging\". He was seen at [facility] in [place] and was cleaned and sutured. He was also given hydrocodone and antibiotics. It has been 5 weeks since the injury. He returns today doing well. Today his pain is mild, rated a 3/10. He notices some dissolvable stiches still under the nail and is wondering if he should be concerned. He has been working on skin desensitization. Otherwise has no problems or concerns. Notes the nail has not yet fallen off.       Symptoms: mild pain, +stiffness, +sensitivity  Location: distal phalanx of ring finger.  Pain severity: 3/10  Pain quality: aching, sharp  Frequency of symptoms: frequently.  Aggravating factors: with any range of motion or palpation .  Relieving factors: at rest, with pain medication.      Previous treatment: antibiotics, hydrocodone    Past medical history:  has no past medical history on file.     Past surgical history:  has no past surgical history on file.     Medications:    No current outpatient prescriptions on file.        Allergies:   No Known Allergies     Family History: family history is not on file.     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 He has never used smokeless tobacco.    Review of Systems:     Denies numbness, tingling, " "parasthesias.   Denies headaches.   Denies fevers, chills, night sweats   Denies chest pain.   Denies shortness of breath.   Denies any skin problems, abrasions, rashes, irritation.      This document serves as a record of the services and decisions personally performed and made by Jason Lovelace MD. It was created on his behalf by Marsha Chauhan, a trained medical scribe. The creation of this document is based the provider's statements to the medical scribe.    Scribe Marsha Chauhan 9:10 AM 3/21/2018        Physical Exam  GENERAL APPEARANCE: healthy, alert, no distress.   SKIN: no suspicious lesions or rashes  NEURO: Normal strength and tone, mentation intact and speech normal  PSYCH:  mentation appears normal and affect normal. Not anxious.  RESPIRATORY: No increased work of breathing.    /82  Pulse 90  Ht 1.575 m (5' 2\")  Wt 82.6 kg (182 lb)  BMI 33.29 kg/m2     LEFT HAND EXAM:    Nail bed intact with 2 mm new nail plate growing proximally.   There is minimal swelling in the distal ring finger.  There is mild tenderness in the distal ring finger.  There is no ecchymosis.  There is no erythema of the surrounding skin.  There is no maceration of the skin.  There is no deformity in the area.  Range of motion: stiff, and (any)movements are not painful.  Decreased sensation to tip of finger tip.  Brisk capillary refill to all fingers.   Palpable radial pulse, 2+    X-rays: no new xrays today.    2 views left ring finger from 3/5/2018 were reviewed personally in clinic today. On my review of the Xrays, minimally displaced tuft fracture distal phalanx. As compared to x-rays from 2/19/2018.      Impression:  27 year old male with a healing left ring finger injury, minimally displaced open left ring finger tuft fracture.    Plan:  * wound has healed well.   * hypersensitivity is common with this injury, appears to be improved since last visit.  * Immobilization: none  * Continue rubbing on different textures to help " with skin desensitization.  * Explained that nail may fall off, but so far appears to remain and new nail growing in now.   * Continue to elevate the finger to reduce swelling    * over the counter pain control, acetaminophen.  * Workability update: ok to return to work with no restrictions on 4/22/2018.  * Return to clinic as needed    The information in this document, created by a scribe for me, accurately reflects the services I personally performed and the decisions made by me. I have reviewed and approved this document for accuracy.      Jason Lovelace M.D., M.S.  Dept. of Orthopaedic Surgery  [facility]

## 2018-03-21 NOTE — LETTER
[facility]  [address] [place] [address]  [place] [address]  [phone]          March 21, 2018    RE:  Jean Pierre Lipscomb                                                                                                                                                       [address]  [facility] [address]            To whom it may concern:    Jean Pierre Lipscomb was seen in clinic today for a medically necessary appointment. He may return to work 3/22/18 without restrictions.       Sincerely,      Electronically Signed (as below)  Jason Lovelace MD

## 2018-03-21 NOTE — LETTER
"    3/21/2018         RE: Jean Pierre Lipscomb  [address]  [facility] [address]        Dear Colleague,    Thank you for referring your patient, Jean Pierre Lipscomb, to the [facility]. Please see a copy of my visit note below.    Chief Complaint:   Chief Complaint   Patient presents with     RECHECK     Left ring finger fx. DOI 2/11/18, 5 week s/p. Patient states his finger is doing pretty good. He only has pain under the nail or where the stiches were, especially if he bumps it hard. Denies any N/T.        HPI: Jean Pierre Lipscomb is a 27 year old male, right-hand dominant, who presents for followup evaluation and management of a left ring finger injury. He injured his hand on 2/11/2018. He was leaning on the wall with his finger between the hinges of the bathroom door when the door opened, crushing the finger. Patient had immediate bleeding, and swelling. After washing off his finger, he noticed his fingertip was \"hanging\". He was seen at [facility] in [place] and was cleaned and sutured. He was also given hydrocodone and antibiotics. It has been 5 weeks since the injury. He returns today doing well. Today his pain is mild, rated a 3/10. He notices some dissolvable stiches still under the nail and is wondering if he should be concerned. He has been working on skin desensitization. Otherwise has no problems or concerns. Notes the nail has not yet fallen off.       Symptoms: mild pain, +stiffness, +sensitivity  Location: distal phalanx of ring finger.  Pain severity: 3/10  Pain quality: aching, sharp  Frequency of symptoms: frequently.  Aggravating factors: with any range of motion or palpation .  Relieving factors: at rest, with pain medication.      Previous treatment: antibiotics, hydrocodone    Past medical history:  has no past medical history on file.     Past surgical history:  has no past surgical history on file.     Medications:    No current outpatient prescriptions on file.        Allergies:   " "No Known Allergies     Family History: family history is not on file.     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 He has never used smokeless tobacco.    Review of Systems:     Denies numbness, tingling, parasthesias.   Denies headaches.   Denies fevers, chills, night sweats   Denies chest pain.   Denies shortness of breath.   Denies any skin problems, abrasions, rashes, irritation.      This document serves as a record of the services and decisions personally performed and made by Jason Lovelace MD. It was created on his behalf by Marsha Chauhan, a trained medical scribe. The creation of this document is based the provider's statements to the medical scribe.    Scribe Marsha Chauhan 9:10 AM 3/21/2018        Physical Exam  GENERAL APPEARANCE: healthy, alert, no distress.   SKIN: no suspicious lesions or rashes  NEURO: Normal strength and tone, mentation intact and speech normal  PSYCH:  mentation appears normal and affect normal. Not anxious.  RESPIRATORY: No increased work of breathing.    /82  Pulse 90  Ht 1.575 m (5' 2\")  Wt 82.6 kg (182 lb)  BMI 33.29 kg/m2     LEFT HAND EXAM:    Nail bed intact with 2 mm new nail plate growing proximally.   There is minimal swelling in the distal ring finger.  There is mild tenderness in the distal ring finger.  There is no ecchymosis.  There is no erythema of the surrounding skin.  There is no maceration of the skin.  There is no deformity in the area.  Range of motion: stiff, and (any)movements are not painful.  Decreased sensation to tip of finger tip.  Brisk capillary refill to all fingers.   Palpable radial pulse, 2+    X-rays: no new xrays today.    2 views left ring finger from 3/5/2018 were reviewed personally in clinic today. On my review of the Xrays, minimally displaced tuft fracture distal phalanx. As compared to x-rays from 2/19/2018.      Impression:  27 year old male with a healing left ring finger injury, minimally " displaced open left ring finger tuft fracture.    Plan:  * wound has healed well.   * hypersensitivity is common with this injury, appears to be improved since last visit.  * Immobilization: none  * Continue rubbing on different textures to help with skin desensitization.  * Explained that nail may fall off, but so far appears to remain and new nail growing in now.   * Continue to elevate the finger to reduce swelling    * over the counter pain control, acetaminophen.  * Workability update: ok to return to work with no restrictions on 4/22/2018.  * Return to clinic as needed    The information in this document, created by a scribe for me, accurately reflects the services I personally performed and the decisions made by me. I have reviewed and approved this document for accuracy.      Jason Lovelace M.D., M.S.  Dept. of Orthopaedic Surgery  [facility]      Again, thank you for allowing me to participate in the care of your patient.        Sincerely,        Jason Lovelace MD

## 2025-06-25 ENCOUNTER — APPOINTMENT (OUTPATIENT)
Dept: CT IMAGING | Facility: CLINIC | Age: 35
End: 2025-06-25
Attending: EMERGENCY MEDICINE
Payer: COMMERCIAL

## 2025-06-25 ENCOUNTER — HOSPITAL ENCOUNTER (EMERGENCY)
Facility: CLINIC | Age: 35
Discharge: HOME | End: 2025-06-25
Attending: EMERGENCY MEDICINE
Payer: COMMERCIAL

## 2025-06-25 VITALS
BODY MASS INDEX: 32.2 KG/M2 | RESPIRATION RATE: 14 BRPM | OXYGEN SATURATION: 100 % | WEIGHT: 175 LBS | HEIGHT: 62 IN | TEMPERATURE: 98.7 F | DIASTOLIC BLOOD PRESSURE: 69 MMHG | HEART RATE: 81 BPM | SYSTOLIC BLOOD PRESSURE: 98 MMHG

## 2025-06-25 DIAGNOSIS — R07.9 ACUTE CHEST PAIN: ICD-10-CM

## 2025-06-25 DIAGNOSIS — R10.13 ABDOMINAL PAIN, EPIGASTRIC: ICD-10-CM

## 2025-06-25 DIAGNOSIS — K92.2 UGIB (UPPER GASTROINTESTINAL BLEED): ICD-10-CM

## 2025-06-25 LAB
ALBUMIN SERPL BCG-MCNC: 4.2 G/DL (ref 3.5–5.2)
ALP SERPL-CCNC: 69 U/L (ref 40–150)
ALT SERPL W P-5'-P-CCNC: 26 U/L (ref 0–70)
ANION GAP SERPL CALCULATED.3IONS-SCNC: 11 MMOL/L (ref 7–15)
AST SERPL W P-5'-P-CCNC: 15 U/L (ref 0–45)
ATRIAL RATE - MUSE: 85 BPM
BASOPHILS # BLD AUTO: 0.1 10E3/UL (ref 0–0.2)
BASOPHILS NFR BLD AUTO: 1 %
BILIRUB DIRECT SERPL-MCNC: 0.09 MG/DL (ref 0–0.3)
BILIRUB SERPL-MCNC: 0.3 MG/DL
BUN SERPL-MCNC: 5.3 MG/DL (ref 6–20)
CALCIUM SERPL-MCNC: 9.3 MG/DL (ref 8.8–10.4)
CHLORIDE SERPL-SCNC: 108 MMOL/L (ref 98–107)
CREAT SERPL-MCNC: 0.82 MG/DL (ref 0.67–1.17)
DIASTOLIC BLOOD PRESSURE - MUSE: NORMAL MMHG
EGFRCR SERPLBLD CKD-EPI 2021: >90 ML/MIN/1.73M2
EOSINOPHIL # BLD AUTO: 0.2 10E3/UL (ref 0–0.7)
EOSINOPHIL NFR BLD AUTO: 2 %
ERYTHROCYTE [DISTWIDTH] IN BLOOD BY AUTOMATED COUNT: 12.3 % (ref 10–15)
GLUCOSE SERPL-MCNC: 107 MG/DL (ref 70–99)
HCO3 SERPL-SCNC: 21 MMOL/L (ref 22–29)
HCT VFR BLD AUTO: 45.5 % (ref 40–53)
HGB BLD-MCNC: 15.2 G/DL (ref 13.3–17.7)
HOLD SPECIMEN: NORMAL
IMM GRANULOCYTES # BLD: 0 10E3/UL
IMM GRANULOCYTES NFR BLD: 0 %
INTERPRETATION ECG - MUSE: NORMAL
LIPASE SERPL-CCNC: 39 U/L (ref 13–60)
LYMPHOCYTES # BLD AUTO: 1.6 10E3/UL (ref 0.8–5.3)
LYMPHOCYTES NFR BLD AUTO: 24 %
MCH RBC QN AUTO: 30.1 PG (ref 26.5–33)
MCHC RBC AUTO-ENTMCNC: 33.4 G/DL (ref 31.5–36.5)
MCV RBC AUTO: 90 FL (ref 78–100)
MONOCYTES # BLD AUTO: 0.4 10E3/UL (ref 0–1.3)
MONOCYTES NFR BLD AUTO: 6 %
NEUTROPHILS # BLD AUTO: 4.5 10E3/UL (ref 1.6–8.3)
NEUTROPHILS NFR BLD AUTO: 67 %
NRBC # BLD AUTO: 0 10E3/UL
NRBC BLD AUTO-RTO: 0 /100
P AXIS - MUSE: 28 DEGREES
PLATELET # BLD AUTO: 297 10E3/UL (ref 150–450)
POTASSIUM SERPL-SCNC: 3.8 MMOL/L (ref 3.4–5.3)
PR INTERVAL - MUSE: 144 MS
PROT SERPL-MCNC: 7 G/DL (ref 6.4–8.3)
QRS DURATION - MUSE: 84 MS
QT - MUSE: 342 MS
QTC - MUSE: 406 MS
R AXIS - MUSE: 26 DEGREES
RBC # BLD AUTO: 5.05 10E6/UL (ref 4.4–5.9)
SODIUM SERPL-SCNC: 140 MMOL/L (ref 135–145)
SYSTOLIC BLOOD PRESSURE - MUSE: NORMAL MMHG
T AXIS - MUSE: 3 DEGREES
TROPONIN T SERPL HS-MCNC: <6 NG/L
VENTRICULAR RATE- MUSE: 85 BPM
WBC # BLD AUTO: 6.8 10E3/UL (ref 4–11)

## 2025-06-25 PROCEDURE — 96374 THER/PROPH/DIAG INJ IV PUSH: CPT | Mod: 59 | Performed by: EMERGENCY MEDICINE

## 2025-06-25 PROCEDURE — 74174 CTA ABD&PLVS W/CONTRAST: CPT

## 2025-06-25 PROCEDURE — 250N000011 HC RX IP 250 OP 636: Performed by: EMERGENCY MEDICINE

## 2025-06-25 PROCEDURE — 258N000003 HC RX IP 258 OP 636: Performed by: EMERGENCY MEDICINE

## 2025-06-25 PROCEDURE — 99285 EMERGENCY DEPT VISIT HI MDM: CPT | Mod: 25 | Performed by: EMERGENCY MEDICINE

## 2025-06-25 PROCEDURE — 250N000013 HC RX MED GY IP 250 OP 250 PS 637: Performed by: EMERGENCY MEDICINE

## 2025-06-25 PROCEDURE — 96375 TX/PRO/DX INJ NEW DRUG ADDON: CPT | Performed by: EMERGENCY MEDICINE

## 2025-06-25 PROCEDURE — 93010 ELECTROCARDIOGRAM REPORT: CPT | Performed by: EMERGENCY MEDICINE

## 2025-06-25 PROCEDURE — 83690 ASSAY OF LIPASE: CPT | Performed by: EMERGENCY MEDICINE

## 2025-06-25 PROCEDURE — 96361 HYDRATE IV INFUSION ADD-ON: CPT | Performed by: EMERGENCY MEDICINE

## 2025-06-25 PROCEDURE — 93005 ELECTROCARDIOGRAM TRACING: CPT | Performed by: EMERGENCY MEDICINE

## 2025-06-25 PROCEDURE — 82248 BILIRUBIN DIRECT: CPT | Performed by: EMERGENCY MEDICINE

## 2025-06-25 PROCEDURE — 250N000009 HC RX 250: Performed by: EMERGENCY MEDICINE

## 2025-06-25 PROCEDURE — 80048 BASIC METABOLIC PNL TOTAL CA: CPT | Performed by: EMERGENCY MEDICINE

## 2025-06-25 PROCEDURE — 36415 COLL VENOUS BLD VENIPUNCTURE: CPT | Performed by: EMERGENCY MEDICINE

## 2025-06-25 PROCEDURE — 84484 ASSAY OF TROPONIN QUANT: CPT | Performed by: EMERGENCY MEDICINE

## 2025-06-25 PROCEDURE — 99285 EMERGENCY DEPT VISIT HI MDM: CPT | Performed by: EMERGENCY MEDICINE

## 2025-06-25 PROCEDURE — 85004 AUTOMATED DIFF WBC COUNT: CPT | Performed by: EMERGENCY MEDICINE

## 2025-06-25 RX ORDER — IOPAMIDOL 755 MG/ML
72 INJECTION, SOLUTION INTRAVASCULAR ONCE
Status: COMPLETED | OUTPATIENT
Start: 2025-06-25 | End: 2025-06-25

## 2025-06-25 RX ORDER — OXYCODONE HYDROCHLORIDE 5 MG/1
5 TABLET ORAL EVERY 6 HOURS PRN
Qty: 12 TABLET | Refills: 0 | Status: SHIPPED | OUTPATIENT
Start: 2025-06-25

## 2025-06-25 RX ORDER — ONDANSETRON 2 MG/ML
4 INJECTION INTRAMUSCULAR; INTRAVENOUS ONCE
Status: COMPLETED | OUTPATIENT
Start: 2025-06-25 | End: 2025-06-25

## 2025-06-25 RX ORDER — MAGNESIUM HYDROXIDE/ALUMINUM HYDROXICE/SIMETHICONE 120; 1200; 1200 MG/30ML; MG/30ML; MG/30ML
30 SUSPENSION ORAL ONCE
Status: COMPLETED | OUTPATIENT
Start: 2025-06-25 | End: 2025-06-25

## 2025-06-25 RX ORDER — SUCRALFATE 1 G/1
1 TABLET ORAL 4 TIMES DAILY
Qty: 40 TABLET | Refills: 0 | Status: SHIPPED | OUTPATIENT
Start: 2025-06-25 | End: 2025-07-05

## 2025-06-25 RX ORDER — ONDANSETRON 4 MG/1
4 TABLET, ORALLY DISINTEGRATING ORAL EVERY 8 HOURS PRN
Qty: 15 TABLET | Refills: 3 | Status: SHIPPED | OUTPATIENT
Start: 2025-06-25

## 2025-06-25 RX ORDER — HYDROMORPHONE HYDROCHLORIDE 1 MG/ML
0.5 INJECTION, SOLUTION INTRAMUSCULAR; INTRAVENOUS; SUBCUTANEOUS ONCE
Refills: 0 | Status: COMPLETED | OUTPATIENT
Start: 2025-06-25 | End: 2025-06-25

## 2025-06-25 RX ORDER — SUCRALFATE ORAL 1 G/10ML
1 SUSPENSION ORAL
Status: DISCONTINUED | OUTPATIENT
Start: 2025-06-25 | End: 2025-06-25 | Stop reason: HOSPADM

## 2025-06-25 RX ORDER — OMEPRAZOLE 20 MG/1
20 CAPSULE, DELAYED RELEASE ORAL DAILY
Qty: 30 CAPSULE | Refills: 1 | Status: SHIPPED | OUTPATIENT
Start: 2025-06-25

## 2025-06-25 RX ADMIN — PANTOPRAZOLE SODIUM 40 MG: 40 INJECTION, POWDER, LYOPHILIZED, FOR SOLUTION INTRAVENOUS at 13:10

## 2025-06-25 RX ADMIN — HYDROMORPHONE HYDROCHLORIDE 0.5 MG: 1 INJECTION, SOLUTION INTRAMUSCULAR; INTRAVENOUS; SUBCUTANEOUS at 10:45

## 2025-06-25 RX ADMIN — SUCRALFATE 1 G: 1 SUSPENSION ORAL at 10:47

## 2025-06-25 RX ADMIN — ONDANSETRON 4 MG: 2 INJECTION, SOLUTION INTRAMUSCULAR; INTRAVENOUS at 09:38

## 2025-06-25 RX ADMIN — FAMOTIDINE 20 MG: 10 INJECTION, SOLUTION INTRAVENOUS at 09:49

## 2025-06-25 RX ADMIN — ALUMINUM HYDROXIDE, MAGNESIUM HYDROXIDE, AND SIMETHICONE 30 ML: 200; 200; 20 SUSPENSION ORAL at 09:49

## 2025-06-25 RX ADMIN — SODIUM CHLORIDE 1000 ML: 0.9 INJECTION, SOLUTION INTRAVENOUS at 11:18

## 2025-06-25 RX ADMIN — IOPAMIDOL 72 ML: 755 INJECTION, SOLUTION INTRAVENOUS at 10:57

## 2025-06-25 RX ADMIN — SODIUM CHLORIDE 100 ML: 9 INJECTION, SOLUTION INTRAVENOUS at 10:58

## 2025-06-25 ASSESSMENT — COLUMBIA-SUICIDE SEVERITY RATING SCALE - C-SSRS
6. HAVE YOU EVER DONE ANYTHING, STARTED TO DO ANYTHING, OR PREPARED TO DO ANYTHING TO END YOUR LIFE?: NO
2. HAVE YOU ACTUALLY HAD ANY THOUGHTS OF KILLING YOURSELF IN THE PAST MONTH?: NO
1. IN THE PAST MONTH, HAVE YOU WISHED YOU WERE DEAD OR WISHED YOU COULD GO TO SLEEP AND NOT WAKE UP?: NO

## 2025-06-25 ASSESSMENT — ACTIVITIES OF DAILY LIVING (ADL)
ADLS_ACUITY_SCORE: 41

## 2025-06-25 NOTE — ED TRIAGE NOTES
Pt began to have anterior left chest pain while driving to work. Describes as sharp, radiates to left shoulder. Patient is dizzy and pale. States he threw up this morning, blood noted in vomit. Unable to specify color but stated there was blood in emesis.     Triage Assessment (Adult)       Row Name 06/25/25 0903          Triage Assessment    Airway WDL WDL        Respiratory WDL    Respiratory WDL X;rhythm/pattern     Rhythm/Pattern, Respiratory shortness of breath        Skin Circulation/Temperature WDL    Skin Circulation/Temperature WDL X  pale        Cardiac WDL    Cardiac WDL X;chest pain        Chest Pain Assessment    Chest Pain Location anterior chest, left     Chest Pain Radiation shoulder     Character sharp     Precipitating Factors emotional stress     Associated Signs/Symptoms pallor;dizziness     Chest Pain Intervention cardiac biomarkers drawn;12-lead ECG obtained;cardiac monitor placed        Peripheral/Neurovascular WDL    Peripheral Neurovascular WDL WDL        Cognitive/Neuro/Behavioral WDL    Cognitive/Neuro/Behavioral WDL WDL

## 2025-06-25 NOTE — ED PROVIDER NOTES
"  History     Chief Complaint   Patient presents with    Chest Pain     HPI  History per patient, review of [facility] EMR and Care Everywhere EMR.   Jean Pierre Lipscomb is a 35 year old male who presents with lower anterior central chest pain and epigastric pain which began 6 AM this morning.  Chest/epigastric discomfort is described as tightness and associated with nausea and a single episode of emesis with liquid stomach contents and a small amount of dark red, but no coffee-ground like material or bright red blood.  Last bowel movement this morning was normal and has had no symptoms of GI bleeding in the stools.  Pain is nonradiating, not pleuritic and without associated shortness of breath, cough, hemoptysis or leg pain or leg swelling.  No back or flank pain.  No neurologic deficit or abnormality.  No fever or chills.  No history of prior chest pain or similar pain with exertion or physical activity.    Cardiac risk factors: Vapes nicotine.  PUD risk factors: Vapes nicotine, occasional/infrequent alcohol use.    Allergies:  No Known Allergies    Problem List:    There are no active problems to display for this patient.       Past Medical History:    History reviewed. No pertinent past medical history.    Past Surgical History:    History reviewed. No pertinent surgical history.    Family History:    History reviewed. No pertinent family history.    Social History:  Marital Status:  Single [1]  Social History     Tobacco Use    Smoking status: Never    Smokeless tobacco: Never        Medications:    omeprazole (PRILOSEC) 20 MG DR capsule  ondansetron (ZOFRAN ODT) 4 MG ODT tab  oxyCODONE (ROXICODONE) 5 MG tablet  sucralfate (CARAFATE) 1 GM tablet        Review of Systems  As mentioned in the HPI, in addition focused review of systems was negative.    Physical Exam   BP: 121/89  Pulse: 91  Temp: 98.7  F (37.1  C)  Resp: 14  Height: 157.5 cm (5' 2\")  Weight: 79.4 kg (175 lb)  SpO2: 95 %      Physical Exam  Vitals and nursing " note reviewed.   Constitutional:       General: He is in acute distress.      Appearance: Normal appearance. He is well-developed. He is ill-appearing. He is not diaphoretic.   Eyes:      General: No scleral icterus.     Conjunctiva/sclera: Conjunctivae normal.   Neck:      Trachea: No tracheal deviation.   Cardiovascular:      Rate and Rhythm: Normal rate and regular rhythm.      Pulses: Normal pulses.      Heart sounds: Normal heart sounds. No murmur heard.     No friction rub. No gallop.   Pulmonary:      Effort: Pulmonary effort is normal. No respiratory distress.      Breath sounds: Normal breath sounds. No wheezing, rhonchi or rales.   Abdominal:      General: There is no distension or abdominal bruit.      Palpations: Abdomen is soft. There is no mass or pulsatile mass.      Tenderness: There is abdominal tenderness in the epigastric area. There is no right CVA tenderness, left CVA tenderness, guarding or rebound.      Hernia: No hernia is present.   Genitourinary:     Comments: Rectal examination: He declined rectal examination.  Musculoskeletal:         General: Normal range of motion.      Right lower leg: No edema.      Left lower leg: No edema.   Skin:     General: Skin is warm and dry.      Coloration: Skin is not jaundiced or pale.      Findings: No bruising, erythema or rash.   Neurological:      General: No focal deficit present.      Mental Status: He is alert and oriented to person, place, and time.   Psychiatric:      Comments: Flat affect.       ED Course        Procedures              EKG Interpretation:      Interpreted by Rigo Sweet MD  Time reviewed: upon completion  Symptoms at time of EKG: chest pain   Rhythm: normal sinus   Rate: normal  Axis: normal  Ectopy: none  Conduction: normal  ST Segments/ T Waves: Non specific ST-T wave flattening, no ischemic changes.  Q Waves: none  Comparison to prior: No old EKG available  Clinical Impression: normal EKG       Results for orders placed  or performed during the hospital encounter of 06/25/25   CTA Chest Abdomen Pelvis w Contrast     Status: None    Narrative    EXAM: CTA CHEST ABDOMEN PELVIS W CONTRAST  LOCATION: [facility]  DATE: 6/25/2025    INDICATION: Severe lower substernal and epigastric pain. Chest pain. Nausea and vomiting.  COMPARISON: None.  TECHNIQUE: CT angiogram chest abdomen pelvis during arterial phase of injection of IV contrast. 2D and 3D MIP reconstructions were performed by the CT technologist. Dose reduction techniques were used.   CONTRAST: 72 ml Isovue 370.    FINDINGS:   CT ANGIOGRAM CHEST, ABDOMEN, AND PELVIS: Normal caliber aorta without dissection or stenosis. The visualized arch vessels, celiac axis, SMA, iliac arteries and renal arteries are patent without aneurysm, dissection or stenosis. Small accessory right   renal artery. The inferior mesenteric artery is not identified. No central pulmonary embolism.    LUNGS AND PLEURA: Mild bibasilar dependent atelectasis. Punctate calcified granuloma in the lingula. Otherwise unremarkable.    MEDIASTINUM/AXILLAE: Normal.    CORONARY ARTERY CALCIFICATION: None.    HEPATOBILIARY: Normal.    PANCREAS: Normal.    SPLEEN: Normal.    ADRENAL GLANDS: Normal.    KIDNEYS/BLADDER: Subcentimeter presumed cyst in the right midpole, which does not require follow-up. Excreted contrast within the ureters, which limits evaluation for stones. No urinary tract dilatation. No bladder wall thickening.    BOWEL: Normal. Normal appendix.    LYMPH NODES: Normal.    PELVIC ORGANS: Normal.    MUSCULOSKELETAL: Normal.      Impression    IMPRESSION:  1.  Normal caliber aorta without aortic dissection.    2.  The CHANEL is not identified, which may be a normal variant.    3.  Within the limitations of an arterial phase exam, no acute CT findings in the chest, abdomen or pelvis.     Basic metabolic panel     Status: Abnormal   Result Value Ref Range    Sodium 140 135 - 145 mmol/L     Potassium 3.8 3.4 - 5.3 mmol/L    Chloride 108 (H) 98 - 107 mmol/L    Carbon Dioxide (CO2) 21 (L) 22 - 29 mmol/L    Anion Gap 11 7 - 15 mmol/L    Urea Nitrogen 5.3 (L) 6.0 - 20.0 mg/dL    Creatinine 0.82 0.67 - 1.17 mg/dL    GFR Estimate >90 >60 mL/min/1.73m2    Calcium 9.3 8.8 - 10.4 mg/dL    Glucose 107 (H) 70 - 99 mg/dL   Troponin T, High Sensitivity     Status: Normal   Result Value Ref Range    Troponin T, High Sensitivity <6 <=22 ng/L   [facility] Draw     Status: None    Narrative    The following orders were created for panel order [facility] Draw.  Procedure                               Abnormality         Status                     ---------                               -----------         ------                     Extra Blue Top Tube[[id number]]                             Final result                 Please view results for these tests on the individual orders.   CBC with platelets and differential     Status: None   Result Value Ref Range    WBC Count 6.8 4.0 - 11.0 10e3/uL    RBC Count 5.05 4.40 - 5.90 10e6/uL    Hemoglobin 15.2 13.3 - 17.7 g/dL    Hematocrit 45.5 40.0 - 53.0 %    MCV 90 78 - 100 fL    MCH 30.1 26.5 - 33.0 pg    MCHC 33.4 31.5 - 36.5 g/dL    RDW 12.3 10.0 - 15.0 %    Platelet Count 297 150 - 450 10e3/uL    % Neutrophils 67 %    % Lymphocytes 24 %    % Monocytes 6 %    % Eosinophils 2 %    % Basophils 1 %    % Immature Granulocytes 0 %    NRBCs per 100 WBC 0 <1 /100    Absolute Neutrophils 4.5 1.6 - 8.3 10e3/uL    Absolute Lymphocytes 1.6 0.8 - 5.3 10e3/uL    Absolute Monocytes 0.4 0.0 - 1.3 10e3/uL    Absolute Eosinophils 0.2 0.0 - 0.7 10e3/uL    Absolute Basophils 0.1 0.0 - 0.2 10e3/uL    Absolute Immature Granulocytes 0.0 <=0.4 10e3/uL    Absolute NRBCs 0.0 10e3/uL   Extra Blue Top Tube     Status: None   Result Value Ref Range    Hold Specimen [facility]    Hepatic panel     Status: Normal   Result Value Ref Range    Protein Total 7.0 6.4 - 8.3 g/dL    Albumin 4.2 3.5 - 5.2 g/dL    Bilirubin  Total 0.3 <=1.2 mg/dL    Alkaline Phosphatase 69 40 - 150 U/L    AST 15 0 - 45 U/L    ALT 26 0 - 70 U/L    Bilirubin Direct 0.09 0.00 - 0.30 mg/dL   Lipase     Status: Normal   Result Value Ref Range    Lipase 39 13 - 60 U/L   EKG 12-lead, tracing only     Status: None   Result Value Ref Range    Systolic Blood Pressure  mmHg    Diastolic Blood Pressure  mmHg    Ventricular Rate 85 BPM    Atrial Rate 85 BPM    FL Interval 144 ms    QRS Duration 84 ms     ms    QTc 406 ms    P Axis 28 degrees    R AXIS 26 degrees    T Axis 3 degrees    Interpretation ECG       Sinus rhythm with sinus arrhythmia  Nonspecific T wave abnormality  Abnormal ECG  No previous ECGs available  Confirmed by SEE ED PROVIDER NOTE FOR, ECG INTERPRETATION ([id number]),  Leif Faria ([id number]) on 6/25/2025 11:43:08 PM     CBC with Platelets & Differential     Status: None    Narrative    The following orders were created for panel order CBC with Platelets & Differential.  Procedure                               Abnormality         Status                     ---------                               -----------         ------                     CBC with platelets and ...[[id number]]                      Final result                 Please view results for these tests on the individual orders.          Medications   ondansetron (ZOFRAN) injection 4 mg (4 mg Intravenous $Given 6/25/25 0938)   alum & mag hydroxide-simethicone (MAALOX) suspension 30 mL (30 mLs Oral $Given 6/25/25 0949)   famotidine (PEPCID) injection 20 mg (20 mg Intravenous $Given 6/25/25 0949)   HYDROmorphone (PF) (DILAUDID) injection 0.5 mg (0.5 mg Intravenous $Given 6/25/25 1045)   iopamidol (ISOVUE-370) solution 72 mL (72 mLs Intravenous $Given 6/25/25 1057)   sodium chloride 0.9 % bag for CT scan flush (100 mLs Intravenous $Given 6/25/25 1058)   sodium chloride 0.9% BOLUS 1,000 mL (0 mLs Intravenous Stopped 6/25/25 1310)   pantoprazole (PROTONIX) injection 40 mg (40 mg  Intravenous $Given 6/25/25 1310)     10:35 AM -I reviewed the results of his evaluation with he and his wife, who has now arrived to the ED.  EKG and laboratory evaluation unremarkable.  No improvement in pain after antacid and Pepcid IV.  Will give Dilaudid and proceed with CT imaging for further evaluation of potential emergent causes of his pain.    Much improved after IV Dilaudid, he rested and slept comfortably.  He was reexamined and still has epigastric pain but abdomen is benign and nonsurgical and he declined any further neurologic, and is comfortable with discharge home.    Assessments & Plan (with Medical Decision Making)   35 year old male who presents with lower anterior central chest pain and epigastric pain which began 6 AM this morning, with nausea and a single episode of emesis with liquid stomach contents and a small amount of dark red, but no coffee-ground like material or bright red blood.  Last bowel movement this morning was normal and has had no symptoms of GI bleeding in the stools.  He declined a rectal examination.  He has epigastric pain which improved with antacid and IV Pepcid, and IV Dilaudid.  I suspect this pain is secondary to gastritis/PUD with probable small benign upper GI bleeding which may be secondary to gastritis or Tracey-Toure tear from single episode of emesis.  He is hemodynamically stable with normal hemoglobin and unremarkable laboratory evaluation, and CTA chest, abdomen pelvis showed no acute abnormality or evidence of dissection or other emergent disease process causing his chest/epigastric pain.  He was also given IV fluids, IV Zofran, IV Protonix and and sucralfate po.   Doubt ACS/atypical ACS, aneurysm/dissection, pericarditis, PE/DVT, PTX, pneumonia or emergent GI or hepatobiliary disease process as a cause of the pain.  He is comfortable discharge home and he and his wife are comfortable with today's evaluation and disposition plan.  Will initiate PPI therapy  with omeprazole, have him use antacid 4 times daily, 30 minutes before meals at bedtime, and Rx sucralfate.  I will prescribe him a small number of oxycodone to use if needed for pain and Zofran use if needed for nausea.  I recommend he return immediately for worsening pain, recurrent evidence of GI bleeding or any symptoms of of blood loss anemia.  I will make a referral for this expedited follow-up in primary care clinic in 3-5 days and outpatient upper endoscopy could be considered should the symptoms persist or fail to improve as would be expected with treatment for GERD/esophagitis/gastritis/PUD.  He and his wife were provided instructions for supportive care and will return as needed for worsened condition or worsening symptoms, or new problems or concerns.      I have reviewed the nursing notes.    I have reviewed the findings, diagnosis, plan and need for follow up with the patient.    Medical Decision Making: High complexity      Discharge Medication List as of 6/25/2025  1:14 PM        START taking these medications    Details   omeprazole (PRILOSEC) 20 MG DR capsule Take 1 capsule (20 mg) by mouth daily. to decrease stomach acid production., Disp-30 capsule, R-1, E-Prescribe      ondansetron (ZOFRAN ODT) 4 MG ODT tab Take 1 tablet (4 mg) by mouth every 8 hours as needed for vomiting or nausea., Disp-15 tablet, R-3, E-Prescribe      oxyCODONE (ROXICODONE) 5 MG tablet Take 1 tablet (5 mg) by mouth every 6 hours as needed., Disp-12 tablet, R-0, E-Prescribe      sucralfate (CARAFATE) 1 GM tablet Take 1 tablet (1 g) by mouth 4 times daily for 10 days., Disp-40 tablet, R-0, E-Prescribe             Final diagnoses:   Acute chest pain   Abdominal pain, epigastric   UGIB (upper gastrointestinal bleed) - Single emesis with small amount of dark red blood       6/25/2025   [facility] EMERGENCY DEPT       Rigo Sweet MD  06/26/25 2744

## 2025-06-25 NOTE — DISCHARGE INSTRUCTIONS
Use an over-the-counter antacid 4 times daily to neutralize stomach acid (30 minutes before meals and at bedtime)     Use Prilosec/Omeprazole to lower stomach acid production, an acid reducer    Use Carafate/Sucralfate to promote stomach healing.    Oxycodone if needed for pain not relieved by Tylenol/Acetaminophen    Zofran/Ondansetron if needed for nausea